# Patient Record
Sex: FEMALE | Race: OTHER | HISPANIC OR LATINO | Employment: FULL TIME | URBAN - METROPOLITAN AREA
[De-identification: names, ages, dates, MRNs, and addresses within clinical notes are randomized per-mention and may not be internally consistent; named-entity substitution may affect disease eponyms.]

---

## 2019-02-06 ENCOUNTER — APPOINTMENT (OUTPATIENT)
Dept: OCCUPATIONAL MEDICINE | Facility: CLINIC | Age: 54
End: 2019-02-06
Payer: OTHER MISCELLANEOUS

## 2019-02-06 PROCEDURE — 99213 OFFICE O/P EST LOW 20 MIN: CPT

## 2019-02-07 ENCOUNTER — TELEPHONE (OUTPATIENT)
Dept: NEUROLOGY | Facility: CLINIC | Age: 54
End: 2019-02-07

## 2019-02-07 NOTE — TELEPHONE ENCOUNTER
Worker's Comp Case Details:       Date Of Injury 1/6/2019   Employer: [employer] [phone]     Claim # [id number]   Ins Co Name & Address:   [place] [address]   Ins Adjustor: Soco Liang   Ins Phn # [phone] Ext [phone]   Ins Fax # [phone]

## 2019-02-08 ENCOUNTER — TELEPHONE (OUTPATIENT)
Dept: NEUROLOGY | Facility: CLINIC | Age: 54
End: 2019-02-08

## 2019-02-12 NOTE — PROGRESS NOTES
[facility] Consult   PATIENT:  Berenice Little  MRN:  [id number]  :  1965  DATE OF SERVICE:  2019  REFERRED BY: No ref  provider found  PMD: ISRAEL Tan    Assessment:     Berenice Little is a 52 y o  female referred here for evaluation of possible mild TBI/concussion  Neurocognitive assessment reveals normal neurological exam, except for depressed mood and affect  Head trauma  Possible Mild TBI/concussion:  Patient reports on 2019, she reports a patient fell on her and she fell back in the left occipital/temporal region, left shoulder and somehow broke 2 teeth  She reports acute symptoms included:  Dizziness, headache, no loss of consciousness, no amnesia, denies other acute concussion symptoms  She reports in the emergency department noncontrast head CT and CT C-spine were normal (unable to personally review)  * She reports no improvement in the past 6 weeks   She reports symptoms of cognitive dysfunction, insomnia and intractable headache, discussed below    *We have discussed concussions and the natural course of recovery  We have discussed that symptoms from a concussion typically take 2-4 weeks to resolve, and that at this point symptoms would no longer be directly due to concussion  We discussed that from the history obtained, in my professional opinion, it is possible but not probable that she had a concussion  Regardless, the pathophysiology of concussion would be over at this point and continued symptoms would be due to contributing factors as discussed below  - we discussed that symptoms with no improvement over the past 6 weeks is not consistent with concussion  - we discussed that the cognitive dysfunction displayed today is not consistent with brain injury  - we discussed that new research regarding concussion shows that the sooner 1 returns to their pre injury routine, the sooner recovery typically happens      Likelihood of Concussion:  Witnessed mechanism  yes   Typical Symptoms no   Onset of symptoms within 24H yes   Improving Time Course no   Is there an alternative Diagnosis maybe     Typical Symptoms= Yes if:  ? 1 A symptoms: Loss of consciousness or Amnesia  ? 3 B symptoms: Confusion/Fogginess, Headache, Dizziness/Loss  of  Balance, Nausea/Vomiting, Drowsiness, Vision  Changes/Photophobia, Phonophobia, Tinnitus, Mood  change    How would you classify the concussion?   possible       Cognitive dysfunction:  Patient reports trouble remembering what she is talking about, trouble with recalling simple things like days of the week and she could not even tell me the months of the year in forward order  Brief Cognitive screen Sanjay Walker was 19/30 - with 1 point off for today's date, 1 point off for the day of the week, patient hesitated when she told me the year, 2/5 for concentration 0/5 for delayed recall   - we discussed that this picture of dysfunction is not consistent with brain injury due to concussion, rather more likely related to depression/mood, pain, poor sleep  Post traumatic headache  Photophobia, nausea  Patient reports intractable headaches since January 6, 2019  She describes bitemporal throbbing that is all day every day  She denies aura and reports every associated symptom I asked her about, but was unable to give clear details regarding multiple of these symptoms     - she reports she has not been eating very much lately which certainly could contribute to her headaches, she also has not been sleeping more than 3-4 hours a night, could also be cervicogenic component, certainly has migrainous components  - we discussed that sometimes headaches following trauma can linger on due to not returning to normal routine and recommended she gradually return to her normal routine    Abortive:  - she reports she has not even tried over-the-counter ibuprofen or Tylenol for her headaches, and I recommended she consider this to see if that helps  - recommended ondansetron for nausea as patient reports she has not been eating because of nausea  - recommended she consider prescription medications if over-the-counter pain medications do not work, could consider:  Toradol 10 mg, indomethacin or steroids    Preventative:  - she does not like taking medications and therefore would like to avoid prescription medication - I did recommend considering it and future options include:  Venlafaxine which could also help with depression or beta-blocker or verapamil which could also help with high blood pressure (also recommended she follow up with primary care provider regarding high blood pressure)  - since she does not want to take prescription medications, recommended over-the-counter preventative supplements including magnesium, riboflavin, butterbur and melatonin  - discussed headache hygiene and lifestyle factors that could improve her headaches including increasing hydration, limiting caffeine intake, not skipping meals    Depression  When asked about depression patient denies, however on depression PHQ-9 screening patient scored 22 consistent with severe depression  She denies suicidal ideation, but admitted to basically every other symptom     - recommended patient follow-up with psychology/therapist and consider antidepressant    Insomnia:  She reports she only sleeping 3-4 hours a night, and that she gets up and watches TV at night   - discussed sleep hygiene and recommended melatonin 3 mg nightly  - could consider sleep referral in the future if indicated, patient does snore      Patient inctructions:       Cognitive Plan:   [x]  Continue to gradually return to work     Additional Testing or Referrals:   Referral to other specialist  [x] Psychologist/therapist - Cognitive behavioral therapy     Headache/migraine treatment:   Abortive medications (for immediate treatment of a headache): Ok to take ibuprofen or acetaminophen for headaches, but try to limit the amount and frequency that you are taking to avoid medication overuse/rebound headache  - do not take more than 3 days a week   1  Ibuprofen 200mg tablet- may take 3 tabs every 6 hours as needed WITH FOOD  OR  2  Acetaminophen 325mg- may take 2 tabs every 6 hours as needed    Zofran/ondansetron for nausea     Over the counter preventive supplements for headaches/migraines   (to take every day to help prevent headaches - not to take at the time of headache):  - Mind ease can be found online and has all of these   3  Magnesium 400-500mg daily    4  Riboflavin (Vitamin B2) 400mg daily  (FYI B2 may make your urine bright/neon yellow)  AND/OR  5  Herbal medication: Petasites/Butterbur 150 mg daily  (When choosing your Butterbur online or in the store, beware that there are some poor preps containing pyrrolizidine alkaloids (PAs) that can be harmful to the liver  Therefore, do not use butterbur products that are not certified and labeled as hepatotoxic PA-free )    Prescription preventive medications for headaches/migraines   (to take every day to help prevent headaches - not to take at the time of headache):  Due to the severity of your symptoms I feel a prescription medication may help sooner recovery,   - could consider venlafaxine  - could consider betablocker which could also help with high blood pressure     Sleep:  6  Melatonin - you may take 3 mg nightly for sleep  You should take this 1 hour prior to bedtime consistently every night for it to work  It works by gradually helping to adjust your sleep time over days to weeks, rather than immediately making you feel sleepy  Self-Monitorin  Headache calendar  Each day zeferino a number from 0-10 indicating if there was a headache and how bad it was  This can be used to monitor gradual improvement and is helpful to make medication adjustments  Lifestyle Recommendations:  8  Maintain good sleep hygiene    Going to bed and waking up at consistent times, avoiding excessive daytime naps, avoiding caffeinated beverages in the evening, avoid excessive stimulation in the evening and generally using bed primarily for sleeping  One hour before bedtime would recommend turning lights down lower, decreasing your activity (may read quietly, listen to music at a low volume)  When you get into bed, should eliminate all technology (no texting, emailing, playing with your phone, iPad or tablet in bed)  9  Maintain good hydration  Drink  2L of fluid a day (4 typical small water bottles)  10  Maintain good nutrition  In particular don't skip meals and eat balanced meals regularly  Education and Follow-up  11  Please contact us if any questions or concerns arise  6 weeks if needed         CC:   Loyda Velasquez is a 52y o  year old  right handed female who presents for evaluation following a possible concussion  History of Present Illness:     Past medical history significant for:  10/2017 elevated liver function, status post liver biopsy, hepatitis, liver function improved to near normal 11/2017    This is a Concussion Case under litigation  Patient understands that we will not be writing any letters or working with  on their behalf  However, we will continue to do our best to provide the best medical care possible  Follows with Occupational Medicine for [address] and /or [id number] related issues  Date/time of injury: 1/6/19  Definite reported mechanism of injury?   (discrete event with force to the head or rapid head movement without impact): Yes   Mechanism/Cause of injury: fall  Impact Location: Occipital   Intracranial injury or skull fx?: No  Amnesia:  Henry? negative; Retro? negative; Loss of Conciousness?  did not occur    Seizure? No  Was there an onset of typical symptoms within 24-48 hours of the injury event? No   Has there been gradual recovery or stability of symptoms over the first week of the injury?    [] Yes (There have been improving symptoms over the first week)  [] Yes (There have been stable symptoms over the first week)  [x] No (There have been worsening symptoms over the first week)      Specifics:   On 1/6/19, was standing behind a 300+ pound patient and she fell on her and she fell back and hit left occipital/temporal region, shoulder, broke two teeth  Acute symptoms included: dizziness, headache  No loss of consciousness, no amnesia    They took her to ED, NCHCT and C spine was normal  Diagnosis of concussion  No improvement in the past 6 weeks per patient    Going to physical therapy for back strain, 3 times so far     Work:  - return to work 02/03/2019, but reports she was unable to work due to headache, nausea, vomiting  - following with Occupational Medicine  - back for 4 hours a day - normally 8 hours     #Cognitive dysfunction:  Has trouble remembering what she is talking about   Calling her  her Ex's name    What is your current pain level - 8    Headaches started at what age? Headaches once every 3 months before this  Started 1/6/19  How often do the headaches occur? daily  What time of the day do the headaches start? Always there   How long do the headaches last? All day   Are you ever headache free? No    Aura? without aura    Describe your usual headache pain quality? Throbbing, pressure, pulsating  Where is your headache located? Bitemporal, sometimes bioccipital stabbing   What is the intensity of pain?  Worst, average 8   Associated symptoms:   [x] Nausea       [x] Vomiting     [x] Insomnia    [x] Stiff or sore neck   [x] Problems with concentration  [x] Photophobia     [x]Phonophobia    - has been avoiding lights and tv etc    [x] Osmophobia  [x] Blurred vision - only when not wearing glasses  [x] Prefer quiet, dark room  [x] Light-headed or dizzy     [x] Tinnitus - right ear sometimes left - lasts for 30 minutes, comes and goes, twice a day    [x] Hands or feet tingle or feel numb/paresthesias  - rarely when laying in bed median nerve tingling and cramps in feet        Headache triggers:  Talking, sunlight, fatigue, related to sleep     Have you seen someone else for headaches or pain? No  Have you had trigger point injection performed and how often? No  Have you had Botox injection performed and how often? No   Have you had epidural injections or transforaminal injections performed? No  Have you used CBD or THC for your headaches and how often? No  Are you current pregnant or planning on getting pregnant? No, post menopause    Have you ever had any Brain imaging? CT scan     What medications do you take or have you taken for your headaches? ABORTIVE:    Naproxen took once and did not help  Has not tried ibuprofen or acetaminophen    Lavender with heating pad helps    PREVENTIVE:   None       Alternative therapies used in the past for headaches? Neck and back PT currently     LIFESTYLE  Sleep - averages 3-4 hours  Gets up and watches TV  Used to sleep all night     Do you wake up with headaches? Yes  Do you snore while asleep? Yes  Have you been told that you stop breathing during sleeping? No  Do you wake up tired in the morning? Yes  Do you take frequent naps during the day? No    Physical activity:   None before or after    Water: 3-4 bottles   Caffeine: 2 cups a day   Diet:  Do you ever skip meals?  Has not been eating due to nausea - zofran helped     Mood: denies history of anxiety or depression     PHQ-9 Depression Screening    PHQ-9:    Frequency of the following problems over the past two weeks:       Little interest or pleasure in doing things:  3 - nearly every day  Feeling down, depressed, or hopeless:  3 - nearly every day  Trouble falling or staying asleep, or sleeping too much:  3 - nearly every day  Feeling tired or having little energy:  3 - nearly every day  Poor appetite or overeating:  3 - nearly every day  Feeling bad about yourself - or that you are a failure or have let yourself or your family down:  3 - nearly every day  Trouble concentrating on things, such as reading the newspaper or watching television:  3 - nearly every day  Moving or speaking so slowly that other people could have noticed  Or the opposite - being so fidgety or restless that you have been moving around a lot more than usual:  1 - several days  Thoughts that you would be better off dead, or of hurting yourself in some way:  0 - not at all  PHQ-2 Score:  6  PHQ-9 Score:  22         The following portions of the patient's history were reviewed in the system and updated as appropriate: allergies, current medications, past family history, past medical history, past social history, past surgical history and problem list     Past Medical History:       Past Medical History:   Diagnosis Date    Migraine      Patient Active Problem List   Diagnosis    Elevated blood-pressure reading without diagnosis of hypertension       Medications:      No current outpatient medications on file  No current facility-administered medications for this visit  Allergies:    No Known Allergies    Family History:    [] Migraines  [] Learning disability (ADHD, dyslexia)   [] Psych disorder (depression, anxiety)   [x] none of the above    History reviewed  No pertinent family history        Social History:   Work: caregiver  Education: 1 year of OT, [employer]   Lives with  and 3 kids - 3, 9, 13    Illicit Drugs: denies  Alcohol/tobacco: Denies alcohol use, Denies tobacco use    Social History     Socioeconomic History    Marital status: /Civil Union     Spouse name: Not on file    Number of children: Not on file    Years of education: Not on file    Highest education level: Not on file   Occupational History    Not on file   Social Needs    Financial resource strain: Not on file    Food insecurity:     Worry: Not on file     Inability: Not on file    Transportation needs:     Medical: Not on file     Non-medical: Not on file   Tobacco Use    Smoking status: Former Smoker    Smokeless tobacco: Never Used   Substance and Sexual Activity    Alcohol use: Never     Frequency: Never    Drug use: Never    Sexual activity: Not on file   Lifestyle    Physical activity:     Days per week: Not on file     Minutes per session: Not on file    Stress: Not on file   Relationships    Social connections:     Talks on phone: Not on file     Gets together: Not on file     Attends Sikhism service: Not on file     Active member of club or organization: Not on file     Attends meetings of clubs or organizations: Not on file     Relationship status: Not on file    Intimate partner violence:     Fear of current or ex partner: Not on file     Emotionally abused: Not on file     Physically abused: Not on file     Forced sexual activity: Not on file   Other Topics Concern    Not on file   Social History Narrative    Not on file       Objective:                      Physical Exam:                                                                 Vitals:            Constitutional:    /74 (BP Location: Right arm, Patient Position: Sitting, Cuff Size: Large)   Pulse 82   Wt 81 6 kg (180 lb)   BP Readings from Last 3 Encounters:   02/13/19 165/74     Pulse Readings from Last 3 Encounters:   02/13/19 82         Well developed, well nourished, well groomed  No dysmorphic features  HEENT:  Normocephalic atraumatic  No meningismus  Oropharynx is clear and moist  No oral mucosal lesions  Chest:  Respirations regular and unlabored  Cardiovascular:  Regular rate, intact distal pulses  Distal extremities warm without palpable edema or tenderness, no observed significant swelling  Musculoskeletal:  Full range of motion  (see below under neurologic exam for evaluation of motor function and gait)   Skin:  warm and dry, not diaphoretic  No apparent birthmarks or stigmata of neurocutaneous disease     Psychiatric:  Depressed mood and affect        Neurological Examination:     Mental status/cognitive function:   Orientated to time, place and person  Recent and remote memory intact  Attention span and concentration as well as fund of knowledge are appropriate for age  Normal language and spontaneous speech  STANDARD ASSESSMENT OF CONCUSSION (Sanjay Strand 83)     1  Orientation (1 point for each correct) Total 5 points   Score   What month is it? 1   What is the date today? 0   What is the day of the week? 0   What year is it? 1   What time is it right now? (within 1 hour is correct) 1     2  Immediate memory (1 point for each): 5 words, 3 trials - total 15 points  finger, aurelia, blanket, lemon, insect    Alternates lists:  candle, paper, sugar, sandwich, wagon  baby, monkey, perfume, sunset, iron  elbow, apple, carpet, saddle, bubble  Jacket, arrow, pepper, cotton, movie  Dollar, honey, mirror, saddle, anchor       Trial 1 Trial 2 Trial 3   Word 1 1 1 1   Word 2 1 1 1   Word 3 0 1 1   Word 4 1 1 1   Word 5 1 1 1       3  Concentration:     a  Digits Backwards (1 point for each): 3, 4, 5, 6 digit span - Total 5 points  [x]493,  [x]3814,  ,  [][id number]     Alternate list:  Lexi Aguilar,  [address] [place] [address],  [address],  [][id number],  [][id number]  [] 685, []6780,  [][id number],  [][id number]     b  Months in REVERSE order (1 point for entire sequence correct)  (Dec, Nov, Oct, Sept, Aug, July, June, May, April, March, Feb, Jan) []   Dec, Jan, Nov, Oct, Sept, - can not go on per patient  Forwards misses Nov and Dec     4  Delayed recall 5 minutes later  (1 point for each of the 5 words) - Total 5 points    Word 1 []   Word 2 []   Word 3 []   Word 4 []   Word 5 []         TOTALS 2/13/2019      Orientation (of 5) 3    Immediate memory (of 15) 14    Concentration (of 5) 2    Delayed recall (of 5) 0    Total (max 30) 19        Cranial Nerves:  II-visual fields full  Fundi appear normal bilaterally  III, IV, VI-Pupils were equal, round, and reactive to light and accomodation  Extraocular movements were full and conjugate without nystagmus  convergence 4 cm, conjugate gaze, normal smooth pursuits, normal saccades   V-facial sensation symmetric  VII-facial expression symmetric, intact forehead wrinkle, strong eye closure, symmetric smile    VIII-hearing grossly intact bilaterally   IX, X-palate elevation symmetric, no dysarthria  XI-shoulder shrug strength intact    XII-tongue protrusion midline  Motor Exam: symmetric bulk and tone throughout, no pronator drift  Power/strength 5/5 bilateral upper and lower extremities, no atrophy, fasciculations or abnormal movements noted  Sensory: grossly intact light touch in all extremities  Reflexes: brachioradialis 2+, biceps 2+, knee 2+, ankle 2+ bilaterally  No ankle clonus  Coordination: Finger nose finger intact bilaterally, no apparent dysmetria, ataxia or tremor noted  Gait: steady casual and tandem gait  Romberg Negative  Pertinent lab results:   Last blood work within the past year     02/04/2018 EKG:  Per primary care provider at [facility] documentation normal sinus rhythm with heart rate 71    Imaging:   NCHCT and CT C spine normal per patient       Was there an alternative explanation for the symptoms? X Yes (comorbid conditions: migraine, exacerbation of current concussion, anxiety, ADHD, etc)  - No (concussion is the only likely cause for the current symptoms)        Review of Systems:   ROS obtained by medical assistant Personally reviewed and updated if indicated  Review of Systems   Constitutional: Positive for appetite change and unexpected weight change  Negative for fever  HENT: Negative  Negative for hearing loss, tinnitus, trouble swallowing and voice change  Eyes: Positive for photophobia  Negative for pain  Respiratory: Negative for chest tightness and shortness of breath  Difficulty breathing   Cardiovascular: Negative  Negative for palpitations     Gastrointestinal: Positive for nausea  Negative for vomiting  Endocrine: Negative  Negative for cold intolerance and heat intolerance  Genitourinary: Negative  Negative for dysuria, frequency and urgency  Musculoskeletal: Positive for back pain and neck pain  Negative for myalgias  Skin: Negative  Negative for rash  Allergic/Immunologic: Negative  Neurological: Positive for dizziness, light-headedness and headaches  Negative for tremors, seizures, syncope, facial asymmetry, speech difficulty, weakness and numbness  Memory problem   Hematological: Negative  Does not bruise/bleed easily  Psychiatric/Behavioral: Positive for sleep disturbance  Negative for confusion and hallucinations  I have spent 60 minutes with Patient and family today in which greater than 50% of this time was spent in counseling/coordination of care regarding Prognosis, Risks and benefits of tx options, Intructions for management, Patient and family education, Importance of tx compliance, Risk factor reductions and Impressions        Author:  Jacqui Rg MD   Fellowship trained Concussion Specialist

## 2019-02-13 ENCOUNTER — OFFICE VISIT (OUTPATIENT)
Dept: NEUROLOGY | Facility: CLINIC | Age: 54
End: 2019-02-13
Payer: OTHER MISCELLANEOUS

## 2019-02-13 VITALS — HEART RATE: 82 BPM | WEIGHT: 180 LBS | SYSTOLIC BLOOD PRESSURE: 165 MMHG | DIASTOLIC BLOOD PRESSURE: 74 MMHG

## 2019-02-13 DIAGNOSIS — R03.0 ELEVATED BLOOD-PRESSURE READING WITHOUT DIAGNOSIS OF HYPERTENSION: ICD-10-CM

## 2019-02-13 DIAGNOSIS — G47.00 INSOMNIA, UNSPECIFIED TYPE: ICD-10-CM

## 2019-02-13 DIAGNOSIS — R11.2 NON-INTRACTABLE VOMITING WITH NAUSEA, UNSPECIFIED VOMITING TYPE: ICD-10-CM

## 2019-02-13 DIAGNOSIS — S06.9X0D MILD TRAUMATIC BRAIN INJURY, WITHOUT LOSS OF CONSCIOUSNESS, SUBSEQUENT ENCOUNTER: Primary | ICD-10-CM

## 2019-02-13 DIAGNOSIS — G44.311 INTRACTABLE ACUTE POST-TRAUMATIC HEADACHE: ICD-10-CM

## 2019-02-13 PROBLEM — S06.9XAA MILD TRAUMATIC BRAIN INJURY: Status: ACTIVE | Noted: 2019-02-13

## 2019-02-13 PROBLEM — S06.9X9A MILD TRAUMATIC BRAIN INJURY (HCC): Status: ACTIVE | Noted: 2019-02-13

## 2019-02-13 PROCEDURE — 99244 OFF/OP CNSLTJ NEW/EST MOD 40: CPT | Performed by: PSYCHIATRY & NEUROLOGY

## 2019-02-13 RX ORDER — ONDANSETRON 8 MG/1
8 TABLET, ORALLY DISINTEGRATING ORAL EVERY 8 HOURS PRN
Qty: 20 TABLET | Refills: 0 | Status: SHIPPED | OUTPATIENT
Start: 2019-02-13 | End: 2022-02-22

## 2019-02-13 NOTE — PATIENT INSTRUCTIONS
Cognitive Plan:   [x]  Continue to gradually return to work     Additional Testing or Referrals:   Referral to other specialist  [x] Psychologist/therapist - Cognitive behavioral therapy     Headache/migraine treatment:   Abortive medications (for immediate treatment of a headache): Ok to take ibuprofen or acetaminophen for headaches, but try to limit the amount and frequency that you are taking to avoid medication overuse/rebound headache  - do not take more than 3 days a week   1  Ibuprofen 200mg tablet- may take 3 tabs every 6 hours as needed WITH FOOD  OR  2  Acetaminophen 325mg- may take 2 tabs every 6 hours as needed    Zofran/ondansetron for nausea     Over the counter preventive supplements for headaches/migraines   (to take every day to help prevent headaches - not to take at the time of headache):  - Mind ease can be found online and has all of these   3  Magnesium 400-500mg daily    4  Riboflavin (Vitamin B2) 400mg daily  (FYI B2 may make your urine bright/neon yellow)  AND/OR  5  Herbal medication: Petasites/Butterbur 150 mg daily  (When choosing your Butterbur online or in the store, beware that there are some poor preps containing pyrrolizidine alkaloids (PAs) that can be harmful to the liver  Therefore, do not use butterbur products that are not certified and labeled as hepatotoxic PA-free )    Prescription preventive medications for headaches/migraines   (to take every day to help prevent headaches - not to take at the time of headache):  Due to the severity of your symptoms I feel a prescription medication may help sooner recovery,   - could consider venlafaxine  - could consider betablocker which could also help with high blood pressure     Sleep:  6  Melatonin - you may take 3 mg nightly for sleep  You should take this 1 hour prior to bedtime consistently every night for it to work   It works by gradually helping to adjust your sleep time over days to weeks, rather than immediately making you feel sleepy  Self-Monitorin  Headache calendar  Each day zeferino a number from 0-10 indicating if there was a headache and how bad it was  This can be used to monitor gradual improvement and is helpful to make medication adjustments  Lifestyle Recommendations:  8  Maintain good sleep hygiene  Going to bed and waking up at consistent times, avoiding excessive daytime naps, avoiding caffeinated beverages in the evening, avoid excessive stimulation in the evening and generally using bed primarily for sleeping  One hour before bedtime would recommend turning lights down lower, decreasing your activity (may read quietly, listen to music at a low volume)  When you get into bed, should eliminate all technology (no texting, emailing, playing with your phone, iPad or tablet in bed)  9  Maintain good hydration  Drink  2L of fluid a day (4 typical small water bottles)  10  Maintain good nutrition  In particular don't skip meals and eat balanced meals regularly  Education and Follow-up  11  Please contact us if any questions or concerns arise    6 weeks if needed

## 2019-02-13 NOTE — PROGRESS NOTES
Review of Systems   Constitutional: Positive for appetite change and unexpected weight change  Negative for fever  HENT: Negative  Negative for hearing loss, tinnitus, trouble swallowing and voice change  Eyes: Positive for photophobia  Negative for pain  Respiratory: Negative for chest tightness and shortness of breath  Difficulty breathing   Cardiovascular: Negative  Negative for palpitations  Gastrointestinal: Positive for nausea  Negative for vomiting  Endocrine: Negative  Negative for cold intolerance and heat intolerance  Genitourinary: Negative  Negative for dysuria, frequency and urgency  Musculoskeletal: Positive for back pain and neck pain  Negative for myalgias  Skin: Negative  Negative for rash  Allergic/Immunologic: Negative  Neurological: Positive for dizziness, light-headedness and headaches  Negative for tremors, seizures, syncope, facial asymmetry, speech difficulty, weakness and numbness  Memory problem   Hematological: Negative  Does not bruise/bleed easily  Psychiatric/Behavioral: Positive for sleep disturbance  Negative for confusion and hallucinations

## 2019-02-20 ENCOUNTER — APPOINTMENT (OUTPATIENT)
Dept: OCCUPATIONAL MEDICINE | Facility: CLINIC | Age: 54
End: 2019-02-20
Payer: OTHER MISCELLANEOUS

## 2019-02-20 PROCEDURE — 99213 OFFICE O/P EST LOW 20 MIN: CPT | Performed by: PREVENTIVE MEDICINE

## 2019-03-06 ENCOUNTER — APPOINTMENT (OUTPATIENT)
Dept: OCCUPATIONAL MEDICINE | Facility: CLINIC | Age: 54
End: 2019-03-06
Payer: OTHER MISCELLANEOUS

## 2019-03-06 PROCEDURE — 99213 OFFICE O/P EST LOW 20 MIN: CPT | Performed by: PREVENTIVE MEDICINE

## 2019-03-27 ENCOUNTER — TELEPHONE (OUTPATIENT)
Dept: NEUROLOGY | Facility: CLINIC | Age: 54
End: 2019-03-27

## 2019-03-27 NOTE — TELEPHONE ENCOUNTER
Patient was a no show for her appointment today with Dr Rena COYLE for her to call back if she would like to reschedule

## 2022-02-11 ENCOUNTER — APPOINTMENT (OUTPATIENT)
Dept: PHYSICAL THERAPY | Facility: CLINIC | Age: 57
End: 2022-02-11

## 2022-02-11 PROCEDURE — 97530 THERAPEUTIC ACTIVITIES: CPT

## 2022-02-22 ENCOUNTER — OFFICE VISIT (OUTPATIENT)
Dept: INTERNAL MEDICINE CLINIC | Facility: CLINIC | Age: 57
End: 2022-02-22
Payer: COMMERCIAL

## 2022-02-22 VITALS
HEIGHT: 67 IN | OXYGEN SATURATION: 96 % | HEART RATE: 100 BPM | TEMPERATURE: 98.9 F | DIASTOLIC BLOOD PRESSURE: 92 MMHG | SYSTOLIC BLOOD PRESSURE: 120 MMHG | WEIGHT: 185 LBS | BODY MASS INDEX: 29.03 KG/M2

## 2022-02-22 DIAGNOSIS — Z12.31 ENCOUNTER FOR SCREENING MAMMOGRAM FOR BREAST CANCER: ICD-10-CM

## 2022-02-22 DIAGNOSIS — Z00.00 ANNUAL PHYSICAL EXAM: Primary | ICD-10-CM

## 2022-02-22 DIAGNOSIS — Z11.4 SCREENING FOR HIV (HUMAN IMMUNODEFICIENCY VIRUS): ICD-10-CM

## 2022-02-22 DIAGNOSIS — Z11.59 NEED FOR HEPATITIS C SCREENING TEST: ICD-10-CM

## 2022-02-22 PROCEDURE — 1036F TOBACCO NON-USER: CPT | Performed by: FAMILY MEDICINE

## 2022-02-22 PROCEDURE — 3008F BODY MASS INDEX DOCD: CPT | Performed by: FAMILY MEDICINE

## 2022-02-22 PROCEDURE — 99386 PREV VISIT NEW AGE 40-64: CPT | Performed by: FAMILY MEDICINE

## 2022-02-22 PROCEDURE — 3725F SCREEN DEPRESSION PERFORMED: CPT | Performed by: FAMILY MEDICINE

## 2022-02-22 NOTE — PROGRESS NOTES
ADULT ANNUAL PHYSICAL   [address]    NAME: Anastasiia Montiel  AGE: 60 y o  SEX: female  : 1965     DATE: 2022     Assessment and Plan:     Problem List Items Addressed This Visit     None      Visit Diagnoses     Annual physical exam    -  Primary    Relevant Orders    Comprehensive metabolic panel    CBC and differential    TSH, 3rd generation with Free T4 reflex    Lipid Panel with Direct LDL reflex    HEMOGLOBIN A1C W/ EAG ESTIMATION    Hepatitis B surface antibody    Need for hepatitis C screening test        Relevant Orders    Hepatitis C Antibody ([facility])    Screening for HIV (human immunodeficiency virus)        Relevant Orders    HIV 1/2 Antigen/Antibody (4th Generation) w Reflex [facility]    Encounter for screening mammogram for breast cancer            Overall well appearing 61 yo female  Immunizations and preventive care screenings were discussed with patient today  Appropriate education was printed on patient's after visit summary  Counseling:  Injury prevention: discussed safety/seat belts, safety helmets, smoke detectors, carbon dioxide detectors, and smoking near bedding or upholstery  · Exercise: the importance of regular exercise/physical activity was discussed  Recommend exercise 3-5 times per week for at least 30 minutes  Return in about 1 year (around 2023) for Annual physical      Chief Complaint:     Chief Complaint   Patient presents with    establish care      History of Present Illness:     Adult Annual Physical   Patient here for a comprehensive physical exam    Needs a physical  Plans on donating a kidney to her   Last seen primary over 5 years  Denies any chronic medcial    Works night shift with [employer]    No family hx of breast or colon cacner     Diet and Physical Activity  · Diet/Nutrition: well balanced diet  · Exercise: walking        Depression Screening  PHQ-2/9 Depression Screening    Little interest or pleasure in doing things: 0 - not at all  Feeling down, depressed, or hopeless: 0 - not at all  PHQ-2 Score: 0  PHQ-2 Interpretation: Negative depression screen       General Health  · Sleep: gets 4-6 hours of sleep on average  · Hearing: normal - bilateral   · Vision: most recent eye exam <1 year ago and wears glasses  · Dental: regular dental visits  /GYN Health  · Patient is: postmenopausal  · Last pap 2 years aog  - has appt with gyn soon  · Last mammo- this year; w/ [facility]   · Last colo - 2 years ago  No polypecotmy      Review of Systems:     Review of Systems   Constitutional: Negative for fatigue and fever  Respiratory: Negative for shortness of breath  Cardiovascular: Negative for chest pain  Gastrointestinal: Negative for constipation and diarrhea        Past Medical History:     Past Medical History:   Diagnosis Date    Migraine       Past Surgical History:     Past Surgical History:   Procedure Laterality Date     SECTION      2     CHOLECYSTECTOMY      SHOULDER SURGERY        Social History:     Social History     Socioeconomic History    Marital status: /Civil Union     Spouse name: None    Number of children: None    Years of education: None    Highest education level: None   Occupational History    None   Tobacco Use    Smoking status: Former Smoker    Smokeless tobacco: Never Used   Vaping Use    Vaping Use: Never used   Substance and Sexual Activity    Alcohol use: Never    Drug use: Never    Sexual activity: None   Other Topics Concern    None   Social History Narrative    None     Social Determinants of Health     Financial Resource Strain: Not on file   Food Insecurity: Not on file   Transportation Needs: Not on file   Physical Activity: Not on file   Stress: Not on file   Social Connections: Not on file   Intimate Partner Violence: Not on file   Housing Stability: Not on file      Family History:     History reviewed  No pertinent family history  Current Medications:     No current outpatient medications on file  No current facility-administered medications for this visit  Allergies:     No Known Allergies   Physical Exam:     /92 (BP Location: Left arm, Patient Position: Sitting)   Pulse 100   Temp 98 9 °F (37 2 °C) (Tympanic)   Ht 5' 7" (1 702 m)   Wt 83 9 kg (185 lb)   SpO2 96%   BMI 28 98 kg/m²     Physical Exam  Vitals and nursing note reviewed  Constitutional:       General: She is not in acute distress  Appearance: She is well-developed  HENT:      Head: Normocephalic and atraumatic  Right Ear: Tympanic membrane and external ear normal       Left Ear: Tympanic membrane and external ear normal    Eyes:      Extraocular Movements: Extraocular movements intact  Conjunctiva/sclera: Conjunctivae normal       Pupils: Pupils are equal, round, and reactive to light  Cardiovascular:      Rate and Rhythm: Normal rate and regular rhythm  Heart sounds: No murmur heard  Pulmonary:      Effort: Pulmonary effort is normal  No respiratory distress  Breath sounds: Normal breath sounds  Abdominal:      Palpations: Abdomen is soft  Tenderness: There is no abdominal tenderness  Musculoskeletal:      Right lower leg: No edema  Left lower leg: No edema  Skin:     General: Skin is warm and dry  Neurological:      Mental Status: She is alert and oriented to person, place, and time     Psychiatric:         Mood and Affect: Mood normal          Behavior: Behavior normal           Abdulaziz Marcelo,   MEDICAL [id number]

## 2022-02-22 NOTE — PATIENT INSTRUCTIONS

## 2022-02-24 ENCOUNTER — APPOINTMENT (OUTPATIENT)
Dept: LAB | Facility: HOSPITAL | Age: 57
End: 2022-02-24
Payer: COMMERCIAL

## 2022-02-24 DIAGNOSIS — Z00.00 ANNUAL PHYSICAL EXAM: ICD-10-CM

## 2022-02-24 DIAGNOSIS — Z11.59 NEED FOR HEPATITIS C SCREENING TEST: ICD-10-CM

## 2022-02-24 DIAGNOSIS — Z11.4 SCREENING FOR HIV (HUMAN IMMUNODEFICIENCY VIRUS): ICD-10-CM

## 2022-02-24 LAB
ALBUMIN SERPL BCP-MCNC: 3.7 G/DL (ref 3.5–5)
ALP SERPL-CCNC: 144 U/L (ref 46–116)
ALT SERPL W P-5'-P-CCNC: 121 U/L (ref 12–78)
ANION GAP SERPL CALCULATED.3IONS-SCNC: 8 MMOL/L (ref 4–13)
AST SERPL W P-5'-P-CCNC: 87 U/L (ref 5–45)
BASOPHILS # BLD AUTO: 0.04 THOUSANDS/ΜL (ref 0–0.1)
BASOPHILS NFR BLD AUTO: 1 % (ref 0–1)
BILIRUB SERPL-MCNC: 0.38 MG/DL (ref 0.2–1)
BUN SERPL-MCNC: 14 MG/DL (ref 5–25)
CALCIUM SERPL-MCNC: 9.2 MG/DL (ref 8.3–10.1)
CHLORIDE SERPL-SCNC: 102 MMOL/L (ref 100–108)
CHOLEST SERPL-MCNC: 194 MG/DL
CO2 SERPL-SCNC: 29 MMOL/L (ref 21–32)
CREAT SERPL-MCNC: 0.79 MG/DL (ref 0.6–1.3)
EOSINOPHIL # BLD AUTO: 0.08 THOUSAND/ΜL (ref 0–0.61)
EOSINOPHIL NFR BLD AUTO: 2 % (ref 0–6)
ERYTHROCYTE [DISTWIDTH] IN BLOOD BY AUTOMATED COUNT: 13.3 % (ref 11.6–15.1)
EST. AVERAGE GLUCOSE BLD GHB EST-MCNC: 120 MG/DL
GFR SERPL CREATININE-BSD FRML MDRD: 82 ML/MIN/1.73SQ M
GLUCOSE P FAST SERPL-MCNC: 88 MG/DL (ref 65–99)
HBA1C MFR BLD: 5.8 %
HCT VFR BLD AUTO: 41.7 % (ref 34.8–46.1)
HDLC SERPL-MCNC: 75 MG/DL
HGB BLD-MCNC: 14.1 G/DL (ref 11.5–15.4)
IMM GRANULOCYTES # BLD AUTO: 0 THOUSAND/UL (ref 0–0.2)
IMM GRANULOCYTES NFR BLD AUTO: 0 % (ref 0–2)
LDLC SERPL CALC-MCNC: 108 MG/DL (ref 0–100)
LYMPHOCYTES # BLD AUTO: 1.91 THOUSANDS/ΜL (ref 0.6–4.47)
LYMPHOCYTES NFR BLD AUTO: 40 % (ref 14–44)
MCH RBC QN AUTO: 30.5 PG (ref 26.8–34.3)
MCHC RBC AUTO-ENTMCNC: 33.8 G/DL (ref 31.4–37.4)
MCV RBC AUTO: 90 FL (ref 82–98)
MONOCYTES # BLD AUTO: 0.29 THOUSAND/ΜL (ref 0.17–1.22)
MONOCYTES NFR BLD AUTO: 6 % (ref 4–12)
NEUTROPHILS # BLD AUTO: 2.43 THOUSANDS/ΜL (ref 1.85–7.62)
NEUTS SEG NFR BLD AUTO: 51 % (ref 43–75)
NRBC BLD AUTO-RTO: 0 /100 WBCS
PLATELET # BLD AUTO: 261 THOUSANDS/UL (ref 149–390)
PMV BLD AUTO: 10.8 FL (ref 8.9–12.7)
POTASSIUM SERPL-SCNC: 3.5 MMOL/L (ref 3.5–5.3)
PROT SERPL-MCNC: 8.4 G/DL (ref 6.4–8.2)
RBC # BLD AUTO: 4.62 MILLION/UL (ref 3.81–5.12)
SODIUM SERPL-SCNC: 139 MMOL/L (ref 136–145)
TRIGL SERPL-MCNC: 57 MG/DL
TSH SERPL DL<=0.05 MIU/L-ACNC: 1.96 UIU/ML (ref 0.36–3.74)
WBC # BLD AUTO: 4.75 THOUSAND/UL (ref 4.31–10.16)

## 2022-02-24 PROCEDURE — 80061 LIPID PANEL: CPT

## 2022-02-24 PROCEDURE — 84443 ASSAY THYROID STIM HORMONE: CPT

## 2022-02-24 PROCEDURE — 86706 HEP B SURFACE ANTIBODY: CPT

## 2022-02-24 PROCEDURE — 36415 COLL VENOUS BLD VENIPUNCTURE: CPT

## 2022-02-24 PROCEDURE — 83036 HEMOGLOBIN GLYCOSYLATED A1C: CPT

## 2022-02-24 PROCEDURE — 80053 COMPREHEN METABOLIC PANEL: CPT

## 2022-02-24 PROCEDURE — 87389 HIV-1 AG W/HIV-1&-2 AB AG IA: CPT

## 2022-02-24 PROCEDURE — 86803 HEPATITIS C AB TEST: CPT

## 2022-02-24 PROCEDURE — 85025 COMPLETE CBC W/AUTO DIFF WBC: CPT

## 2022-02-25 ENCOUNTER — TELEPHONE (OUTPATIENT)
Dept: INTERNAL MEDICINE CLINIC | Facility: CLINIC | Age: 57
End: 2022-02-25

## 2022-02-25 DIAGNOSIS — R74.8 ELEVATED ALKALINE PHOSPHATASE LEVEL: ICD-10-CM

## 2022-02-25 DIAGNOSIS — R74.01 TRANSAMINITIS: Primary | ICD-10-CM

## 2022-02-25 PROBLEM — R73.03 PRE-DIABETES: Status: ACTIVE | Noted: 2022-02-25

## 2022-02-25 LAB
HBV SURFACE AB SER-ACNC: <3.1 MIU/ML
HCV AB SER QL: NORMAL
HIV 1+2 AB+HIV1 P24 AG SERPL QL IA: NORMAL

## 2022-02-25 NOTE — TELEPHONE ENCOUNTER
----- Message from Elisha Ledezma, DO sent at 2/25/2022 10:42 AM EST -----  Please notify pt that her blood work was significant for an elevated a1c in the prediabetic range and elevated liver enzymes  They were elevated  This needs to be follow up with further lab studies and an ultrasound of the liver  If she would like to continue work up I can order the studies today

## 2022-02-25 NOTE — TELEPHONE ENCOUNTER
Patient called back and would like for the US of the liver to be put in, C/S number given  Leola Jimenez would like the lab orders to be put in for additional evaluation   She would like to know if these require her to fast

## 2022-09-28 ENCOUNTER — TELEPHONE (OUTPATIENT)
Dept: ADMINISTRATIVE | Facility: OTHER | Age: 57
End: 2022-09-28

## 2022-09-28 NOTE — TELEPHONE ENCOUNTER
Upon review of the In Basket request we were able to locate, review, and update the patient chart as requested for Mammogram     Any additional questions or concerns should be emailed to the Practice Liaisons via [email or web address] email, please do not reply via In Basket      Thank you  Manolo Goddard

## 2022-09-28 NOTE — TELEPHONE ENCOUNTER
----- Message from Nabeel Eckert sent at 9/27/2022  2:35 PM EDT -----  Regarding: MAMMOGRAM  09/27/22 2:35 PM    Jaye, our patient Prashant Morrissey has had Mammogram completed/performed  Please assist in updating the patient chart by pulling the Care Everywhere (CE) document  The date of service is 10/04/21       Thank you,  Brynn 47

## 2022-10-10 ENCOUNTER — TELEPHONE (OUTPATIENT)
Dept: INTERNAL MEDICINE CLINIC | Facility: CLINIC | Age: 57
End: 2022-10-10

## 2022-10-10 NOTE — TELEPHONE ENCOUNTER
----- Message from Elisha Ledezma DO sent at 10/10/2022  4:10 PM EDT -----  Mammogram negative for malignancy

## 2022-11-09 ENCOUNTER — VBI (OUTPATIENT)
Dept: ADMINISTRATIVE | Facility: OTHER | Age: 57
End: 2022-11-09

## 2022-11-09 NOTE — TELEPHONE ENCOUNTER
11/09/22 9:30 AM     See documentation in the [employer] SmartForm       Debor[facility]

## 2023-07-11 ENCOUNTER — VBI (OUTPATIENT)
Dept: ADMINISTRATIVE | Facility: OTHER | Age: 58
End: 2023-07-11

## 2024-06-04 ENCOUNTER — VBI (OUTPATIENT)
Dept: ADMINISTRATIVE | Facility: OTHER | Age: 59
End: 2024-06-04

## 2024-06-04 NOTE — TELEPHONE ENCOUNTER
06/04/24 10:35 AM     [employer] SmartForm used to document caregap status.    Ashley Pickering MA